# Patient Record
Sex: MALE | Race: WHITE | URBAN - METROPOLITAN AREA
[De-identification: names, ages, dates, MRNs, and addresses within clinical notes are randomized per-mention and may not be internally consistent; named-entity substitution may affect disease eponyms.]

---

## 2024-01-30 ENCOUNTER — APPOINTMENT (RX ONLY)
Dept: URBAN - METROPOLITAN AREA CLINIC 116 | Facility: CLINIC | Age: 44
Setting detail: DERMATOLOGY
End: 2024-01-30

## 2024-01-30 DIAGNOSIS — L91.8 OTHER HYPERTROPHIC DISORDERS OF THE SKIN: ICD-10-CM

## 2024-01-30 PROCEDURE — ? COUNSELING

## 2024-01-30 PROCEDURE — ? BENIGN DESTRUCTION COSMETIC

## 2024-01-30 PROCEDURE — ? INVENTORY

## 2024-01-30 ASSESSMENT — LOCATION SIMPLE DESCRIPTION DERM: LOCATION SIMPLE: RIGHT INFERIOR EYELID

## 2024-01-30 ASSESSMENT — LOCATION ZONE DERM: LOCATION ZONE: EYELID

## 2024-01-30 ASSESSMENT — LOCATION DETAILED DESCRIPTION DERM: LOCATION DETAILED: RIGHT LATERAL INFERIOR EYELID

## 2024-01-30 NOTE — PROCEDURE: COUNSELING
Follow Up Office Visit      Date: 2023   Patient Name: Jo-Ann Krishnan  : 1944   MRN: [id number]     Chief Complaint:    Chief Complaint   Patient presents with    Med Refill    Back Pain     Hurts to pee x 2-3 months.       History of Present Illness: Jo-Ann Krishnan is a 79 y.o. female who is here today for follow-up.  Patient still continues to have some fatigue and weakness and is having significant amount of arthralgias and myalgias with associated back pain.  She is scheduled to see Dr. 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  She does continue to have significant pain.  She does take hydrocodone intermittently and believes that this is beneficial.  She also has been monitoring her blood sugars but states that the range is around 200.  She has not been using her insulin as prescribed.  No other issues have been noted as she does continue to take her other medications without difficulty.  She her activity level as well as her appetite and sleep have been relatively unchanged.  She denies any other cardiovascular, respiratory, gastrointestinal, urologic or neurologic complaints.    Subjective      Review of Systems:   Review of Systems   Constitutional:  Positive for fatigue. Negative for activity change and appetite change.   Respiratory:  Negative for cough and shortness of breath.    Cardiovascular:  Negative for chest pain, palpitations and leg swelling.   Gastrointestinal:  Negative for abdominal distention, abdominal pain, blood in stool, constipation, diarrhea, nausea, vomiting, GERD and indigestion.   Genitourinary:  Negative for dysuria, flank pain, frequency and urgency.   Musculoskeletal:  Positive for arthralgias, back pain, gait problem and myalgias.   Neurological:  Positive for weakness. Negative for dizziness,  speech difficulty, headache, memory problem and confusion.   Psychiatric/Behavioral:  Negative for sleep disturbance and depressed mood. The patient is not nervous/anxious.        I have reviewed the patients family history, social history, past medical history, past surgical history and have updated it as appropriate.     Medications:     Current Outpatient Medications:     acetaminophen (TYLENOL) 500 MG tablet, Take 1 tablet by mouth Every 6 (Six) Hours As Needed for Mild Pain., Disp: , Rfl:     albuterol (PROVENTIL) (2.5 MG/3ML) 0.083% nebulizer solution, Take 2.5 mg by nebulization Every 4 (Four) Hours As Needed for Wheezing or Shortness of Air., Disp: , Rfl:     albuterol sulfate  (90 Base) MCG/ACT inhaler, Inhale 2 puffs Every 6 (Six) Hours As Needed for Wheezing., Disp: 18 g, Rfl: 11    aspirin 81 MG EC tablet, Take 1 tablet by mouth Daily., Disp: , Rfl:     atorvastatin (LIPITOR) 40 MG tablet, TAKE ONE TABLET BY MOUTH EVERY DAY, Disp: 30 tablet, Rfl: 12    busPIRone (BUSPAR) 5 MG tablet, Take 1 tablet by mouth 3 (Three) Times a Day., Disp: , Rfl:     carvedilol (COREG) 25 MG tablet, TAKE ONE TABLET BY MOUTH TWO TIMES A DAY, Disp: 180 tablet, Rfl: 11    cetirizine (zyrTEC) 10 MG tablet, Take 0.5 tablets by mouth Daily., Disp: 30 tablet, Rfl: 0    clopidogrel (PLAVIX) 75 MG tablet, Take 1 tablet by mouth Daily., Disp: 30 tablet, Rfl: 6    Comfort EZ Pen Needles 32G X 4 MM misc, , Disp: , Rfl:     Comfort EZ Pen Needles 32G X 4 MM misc, USE AS DIRECTED DAILY, Disp: , Rfl:     Continuous Blood Gluc  (Dexcom G6 ) device, Use 1 each Daily., Disp: 1 each, Rfl: 11    Continuous Blood Gluc Sensor (Dexcom G6 Sensor), Use Every 10 (Ten) Days., Disp: 2 each, Rfl: 11    Cyanocobalamin (VITAMIN B-12 PO), Take 2,500 mcg by mouth Daily., Disp: , Rfl:     Diclofenac Sodium (VOLTAREN) 1 % gel gel, APPLY TO AFFECTED AREA FOUR TIMES DAILY, Disp: 100 g, Rfl: 12    DULoxetine (CYMBALTA) 60 MG capsule, TAKE  ONE CAPSULE BY MOUTH EVERY DAY, Disp: 90 capsule, Rfl: 3    famotidine (PEPCID) 10 MG tablet, Take 1 tablet by mouth Every Other Day., Disp: 15 tablet, Rfl: 0    fluticasone (FLONASE) 50 MCG/ACT nasal spray, USE 2 SPRAYS IN EACH NOSTRIL ONCE DAILY, Disp: 16 g, Rfl: 12    HYDROcodone-acetaminophen (NORCO) 7.5-325 MG per tablet, Take 1 tablet by mouth Every 12 (Twelve) Hours As Needed for Moderate Pain., Disp: 20 tablet, Rfl: 0    insulin detemir (Levemir) 100 UNIT/ML injection, Inject 10 Units under the skin into the appropriate area as directed Every Night., Disp: 3 mL, Rfl: 11    isosorbide mononitrate (IMDUR) 60 MG 24 hr tablet, TAKE ONE TABLET BY MOUTH EVERY DAY, Disp: 90 tablet, Rfl: 3    losartan (COZAAR) 50 MG tablet, TAKE ONE TABLET BY MOUTH EVERY DAY, Disp: 90 tablet, Rfl: 3    multivitamin with minerals tablet tablet, Take 1 tablet by mouth Daily., Disp: , Rfl:     naloxone (NARCAN) 4 MG/0.1ML nasal spray, 1 spray into the nostril(s) as directed by provider As Needed., Disp: , Rfl:     nicotine (Nicoderm CQ) 21 MG/24HR patch, Place 1 patch on the skin as directed by provider Daily., Disp: 28 each, Rfl: 0    pantoprazole (PROTONIX) 40 MG EC tablet, Take 1 tablet by mouth 2 (Two) Times a Day., Disp: 180 tablet, Rfl: 3    SITagliptin (Januvia) 50 MG tablet, TAKE 1 TABLET BY MOUTH DAILY, Disp: 90 tablet, Rfl: 3    SSD 1 % cream, APPLY TO AFFECTED AREA AS DIRECTED, Disp: 400 g, Rfl: 11    True Metrix Blood Glucose Test test strip, Daily. for testing, Disp: , Rfl:     Current Facility-Administered Medications:     ipratropium-albuterol (DUO-NEB) nebulizer solution 3 mL, 3 mL, Nebulization, 4x Daily - RT, Aiden Spencer MD, 3 mL at 09/08/22 1328    Allergies:   Allergies   Allergen Reactions    Ceclor [Cefaclor] Rash       Immunizations:   Immunization History   Administered Date(s) Administered    COVID-19 (xPeerient) 03/16/2021    COVID-19 (UNSPECIFIED) 03/01/2021, 04/01/2021    Fluzone High Dose =>65  "Years ([facility] ONLY) 10/16/2018    Fluzone High-Dose 65+yrs 10/31/2022, 12/06/2023    Fluzone Quad >6mos (Multi-dose) 11/15/2016, 02/05/2020    Pneumococcal Conjugate 13-Valent (PCV13) 07/07/2016    Pneumococcal Polysaccharide (PPSV23) 10/31/2022        Objective     Physical Exam: Please see above  Vital Signs:   Vitals:    12/06/23 1604   BP: 124/64   BP Location: Left arm   Patient Position: Sitting   Cuff Size: Adult   Pulse: 82   Resp: 16   Temp: 97.7 °F (36.5 °C)   TempSrc: Temporal   SpO2: 100%   Weight: 71.8 kg (158 lb 6.4 oz)   Height: 152.4 cm (60\")     Body mass index is 30.94 kg/m².          Physical Exam  Vitals and nursing note reviewed.   Constitutional:       Appearance: Normal appearance.   HENT:      Head: Normocephalic and atraumatic.      Nose: Nose normal.      Mouth/Throat:      Pharynx: Oropharynx is clear.   Eyes:      Extraocular Movements: Extraocular movements intact.      Pupils: Pupils are equal, round, and reactive to light.   Neck:      Thyroid: No thyroid mass or thyromegaly.      Trachea: Trachea normal.   Cardiovascular:      Rate and Rhythm: Normal rate and regular rhythm.      Pulses: Normal pulses. No decreased pulses.      Heart sounds: Normal heart sounds.   Pulmonary:      Effort: Pulmonary effort is normal.      Breath sounds: Normal breath sounds.   Abdominal:      General: Abdomen is flat. Bowel sounds are normal.      Palpations: Abdomen is soft.      Tenderness: There is no abdominal tenderness.   Musculoskeletal:      Cervical back: Neck supple.      Right lower leg: No edema.      Left lower leg: No edema.   Lymphadenopathy:      Cervical: No cervical adenopathy.   Skin:     General: Skin is warm and dry.   Neurological:      General: No focal deficit present.      Mental Status: She is alert and oriented to person, place, and time.      Sensory: Sensation is intact.      Motor: Motor function is intact.      Coordination: Coordination is intact.   Psychiatric:         " Attention and Perception: Attention normal.         Mood and Affect: Mood normal.         Speech: Speech normal.         Behavior: Behavior normal.         Procedures    Results:   Labs:   Hemoglobin A1C   Date Value Ref Range Status   12/06/2023 7.4 (A) 4.5 - 5.7 % Final   07/20/2023 8.10 (H) 4.80 - 5.60 % Final     TSH   Date Value Ref Range Status   10/02/2023 0.627 0.270 - 4.200 uIU/mL Final        POCT Results (if applicable):   Results for orders placed or performed in visit on 12/06/23   POC Glycosylated Hemoglobin (Hb A1C)    Specimen: Blood   Result Value Ref Range    Hemoglobin A1C 7.4 (A) 4.5 - 5.7 %    Lot Number [id number]     Expiration Date 06/20/2025        Imaging:   No valid procedures specified.     Measures:   Advanced Care Planning:   Patient does not have an advance directive, information provided.    Smoking Cessation:   less than 3 minutes spent counseling Will try to cut down    Assessment / Plan      Assessment/Plan:       1. Type 2 diabetes mellitus with hyperglycemia, with long-term current use of insulin (Primary)  Patient did have a hemoglobin A1c obtained today was noted to be at 7.4%.  This is improved from previous but I have encouraged her to resume the insulin at 10 units at bedtime Levemir.  We will have her continue to monitor her blood sugars and she will notify us if she has any difficulties before her next scheduled appointment in 1 month time.  We will try to get a continuous glucose monitor.  -     Continuous Blood Gluc  (Dexcom G6 ) device; Use 1 each Daily.  Dispense: 1 each; Refill: 11  -     Continuous Blood Gluc Sensor (Dexcom G6 Sensor); Use Every 10 (Ten) Days.  Dispense: 2 each; Refill: 11    2. Stage 4 chronic kidney disease   Patient has recently seen the nephrologist who stated that she is doing well at present time.  She does continue to have a GFR in the 20s.  We have encouraged her to continue with close monitoring of her fluid intake.  She is  currently taking Januvia and we will consider switching her over to Farxiga or Jardiance.  We will pursue and treat accordingly.    3. Mixed hyperlipidemia   We will continue her on her current regiment with respect to her lipid-lowering agent.  We will anticipate rechecking her lipid profile later on this month and will plan on adjusting medications to keep her LDL less than 70.    4. Primary hypertension   Patient's blood pressure is doing well at present time.  No adjustments are necessary currently.    5. Degenerative disc disease, lumbar  Patient does continue to have significant amount of degenerative disc disease symptomatology.  She is following Dr. Machado who is planning on doing a injection but would not be able to if she does continue with the Plavix.  She is scheduled to have a repeat CT scan of her abdomen tomorrow if allowed based on her renal function.  We will have discussed risk and benefits.  Patient has had hydrocodone in the past with relatively good control without any side effects including respiratory depression or cognitive dysfunction.  She rarely takes the medication and is wanting for us to assume the prescription.  We will initiate and will reassess in 1 month's time.  -     HYDROcodone-acetaminophen (NORCO) 7.5-325 MG per tablet; Take 1 tablet by mouth Every 12 (Twelve) Hours As Needed for Moderate Pain.  Dispense: 20 tablet; Refill: 0    7. Need for influenza vaccination  Flu shot was given today.  -     Fluzone High-Dose 65+yrs    8. Cigarette nicotine dependence without complication   Patient does continue to smoke but states that she is going to go  the nicotine teen patch and will try discontinuation.      Follow Up:   Return in about 4 weeks (around 1/3/2024).      At [facility], we believe that sharing information builds trust and better relationships. You are receiving this note because you recently visited [facility]. It is possible you will see health information  before a provider has talked with you about it. This kind of information can be easy to misunderstand. To help you fully understand what it means for your health, we urge you to discuss this note with your provider.    Aiden Spencer MD  [facility]   Detail Level: Detailed